# Patient Record
Sex: MALE | Race: WHITE | NOT HISPANIC OR LATINO | Employment: STUDENT | URBAN - METROPOLITAN AREA
[De-identification: names, ages, dates, MRNs, and addresses within clinical notes are randomized per-mention and may not be internally consistent; named-entity substitution may affect disease eponyms.]

---

## 2024-07-19 ENCOUNTER — APPOINTMENT (OUTPATIENT)
Dept: PRIMARY CARE | Facility: CLINIC | Age: 29
End: 2024-07-19
Payer: COMMERCIAL

## 2024-07-19 VITALS
SYSTOLIC BLOOD PRESSURE: 108 MMHG | OXYGEN SATURATION: 95 % | BODY MASS INDEX: 27.11 KG/M2 | HEART RATE: 88 BPM | HEIGHT: 69 IN | DIASTOLIC BLOOD PRESSURE: 69 MMHG | WEIGHT: 183 LBS

## 2024-07-19 DIAGNOSIS — Z00.00 ROUTINE GENERAL MEDICAL EXAMINATION AT A HEALTH CARE FACILITY: Primary | ICD-10-CM

## 2024-07-19 PROBLEM — L30.9 ECZEMA: Status: ACTIVE | Noted: 2024-07-19

## 2024-07-19 PROBLEM — M79.605 PAIN IN POSTERIOR LEFT LOWER EXTREMITY: Status: ACTIVE | Noted: 2024-07-19

## 2024-07-19 PROBLEM — L70.9 ACNE: Status: ACTIVE | Noted: 2024-07-19

## 2024-07-19 PROBLEM — M25.611 DECREASED RANGE OF MOTION OF RIGHT SHOULDER: Status: ACTIVE | Noted: 2024-07-19

## 2024-07-19 PROBLEM — M54.50 LOW BACK PAIN: Status: ACTIVE | Noted: 2024-07-19

## 2024-07-19 PROBLEM — J30.9 ALLERGIC RHINITIS: Status: ACTIVE | Noted: 2024-07-19

## 2024-07-19 PROBLEM — M25.511 RIGHT SHOULDER PAIN: Status: ACTIVE | Noted: 2024-07-19

## 2024-07-19 PROBLEM — J01.80 OTHER ACUTE SINUSITIS: Status: ACTIVE | Noted: 2024-07-19

## 2024-07-19 PROBLEM — H66.92 ACUTE LEFT OTITIS MEDIA: Status: ACTIVE | Noted: 2024-07-19

## 2024-07-19 PROBLEM — R07.9 ACUTE CHEST PAIN: Status: ACTIVE | Noted: 2024-07-19

## 2024-07-19 PROBLEM — J02.9 SORE THROAT: Status: ACTIVE | Noted: 2024-07-19

## 2024-07-19 PROBLEM — M25.541 METACARPOPHALANGEAL JOINT PAIN OF RIGHT HAND: Status: ACTIVE | Noted: 2024-07-19

## 2024-07-19 PROBLEM — M54.2 CERVICAL PAIN (NECK): Status: ACTIVE | Noted: 2024-07-19

## 2024-07-19 PROBLEM — M77.8 RIGHT WRIST TENDONITIS: Status: ACTIVE | Noted: 2024-07-19

## 2024-07-19 PROBLEM — J34.2 DEVIATED NASAL SEPTUM: Status: ACTIVE | Noted: 2024-07-19

## 2024-07-19 PROBLEM — J06.9 ACUTE UPPER RESPIRATORY INFECTION: Status: ACTIVE | Noted: 2024-07-19

## 2024-07-19 PROBLEM — S43.431A SLAP LESION OF RIGHT SHOULDER: Status: ACTIVE | Noted: 2024-07-19

## 2024-07-19 PROBLEM — Z20.822 SUSPECTED COVID-19 VIRUS INFECTION: Status: ACTIVE | Noted: 2024-07-19

## 2024-07-19 PROBLEM — M75.101 TEAR OF RIGHT ROTATOR CUFF: Status: ACTIVE | Noted: 2024-07-19

## 2024-07-19 PROBLEM — J02.9 VIRAL PHARYNGITIS: Status: ACTIVE | Noted: 2024-07-19

## 2024-07-19 PROBLEM — R44.8 FACIAL PRESSURE: Status: ACTIVE | Noted: 2024-07-19

## 2024-07-19 PROBLEM — M76.51 PATELLAR TENDINITIS OF RIGHT KNEE: Status: ACTIVE | Noted: 2024-07-19

## 2024-07-19 PROBLEM — M54.32 SCIATICA OF LEFT SIDE: Status: ACTIVE | Noted: 2024-07-19

## 2024-07-19 PROCEDURE — 3008F BODY MASS INDEX DOCD: CPT | Performed by: INTERNAL MEDICINE

## 2024-07-19 PROCEDURE — 99395 PREV VISIT EST AGE 18-39: CPT | Performed by: INTERNAL MEDICINE

## 2024-07-19 ASSESSMENT — ENCOUNTER SYMPTOMS
FEVER: 0
CHILLS: 0
SHORTNESS OF BREATH: 0
PALPITATIONS: 0
FATIGUE: 0

## 2024-07-19 ASSESSMENT — PATIENT HEALTH QUESTIONNAIRE - PHQ9
2. FEELING DOWN, DEPRESSED OR HOPELESS: NOT AT ALL
1. LITTLE INTEREST OR PLEASURE IN DOING THINGS: NOT AT ALL
SUM OF ALL RESPONSES TO PHQ9 QUESTIONS 1 AND 2: 0

## 2024-07-19 NOTE — PATIENT INSTRUCTIONS
Please complete fasting blood work. Fast for 10 hours, black coffee and water the morning of labs are OK.       1 year

## 2024-07-19 NOTE — PROGRESS NOTES
"Subjective   Patient ID: Bob Membreno is a 28 y.o. male who presents for Annual Exam.    HPI   Overall doing well  No specific concerns   No CP, SOB, MACIAS or LE edema      Review of Systems   Constitutional:  Negative for chills, fatigue and fever.   Respiratory:  Negative for shortness of breath.    Cardiovascular:  Negative for chest pain and palpitations.       Objective   /69   Pulse 88   Ht 1.753 m (5' 9\")   Wt 83 kg (183 lb)   SpO2 95%   BMI 27.02 kg/m²     Physical Exam  Constitutional:       General: He is not in acute distress.     Appearance: He is not toxic-appearing.   HENT:      Head: Normocephalic and atraumatic.      Mouth/Throat:      Mouth: Mucous membranes are moist.      Pharynx: Oropharynx is clear.   Eyes:      Pupils: Pupils are equal, round, and reactive to light.   Cardiovascular:      Rate and Rhythm: Normal rate and regular rhythm.      Heart sounds: Normal heart sounds. No murmur heard.     No friction rub. No gallop.   Pulmonary:      Effort: Pulmonary effort is normal. No respiratory distress.      Breath sounds: Normal breath sounds. No wheezing or rales.   Skin:     General: Skin is warm and dry.   Neurological:      Mental Status: He is alert and oriented to person, place, and time.   Psychiatric:         Mood and Affect: Mood normal.         Behavior: Behavior normal.         Assessment/Plan     CPE completed and FBW ordered               "

## 2024-07-19 NOTE — PROGRESS NOTES
Subjective   Patient ID: Bob Membreno is a 28 y.o. male who presents for Annual Exam.    HPI     Review of Systems    Objective   There were no vitals taken for this visit.    Physical Exam    Assessment/Plan

## 2024-07-24 ENCOUNTER — LAB (OUTPATIENT)
Dept: LAB | Facility: LAB | Age: 29
End: 2024-07-24
Payer: COMMERCIAL

## 2024-07-24 DIAGNOSIS — Z00.00 ROUTINE GENERAL MEDICAL EXAMINATION AT A HEALTH CARE FACILITY: ICD-10-CM

## 2024-07-24 LAB
ALBUMIN SERPL BCP-MCNC: 4.2 G/DL (ref 3.4–5)
ALP SERPL-CCNC: 38 U/L (ref 33–120)
ALT SERPL W P-5'-P-CCNC: 21 U/L (ref 10–52)
ANION GAP SERPL CALC-SCNC: 12 MMOL/L (ref 10–20)
AST SERPL W P-5'-P-CCNC: 20 U/L (ref 9–39)
BASOPHILS # BLD AUTO: 0.07 X10*3/UL (ref 0–0.1)
BASOPHILS NFR BLD AUTO: 1 %
BILIRUB SERPL-MCNC: 0.7 MG/DL (ref 0–1.2)
BUN SERPL-MCNC: 21 MG/DL (ref 6–23)
CALCIUM SERPL-MCNC: 8.9 MG/DL (ref 8.6–10.3)
CHLORIDE SERPL-SCNC: 101 MMOL/L (ref 98–107)
CHOLEST SERPL-MCNC: 192 MG/DL (ref 0–199)
CHOLESTEROL/HDL RATIO: 4.1
CO2 SERPL-SCNC: 27 MMOL/L (ref 21–32)
CREAT SERPL-MCNC: 0.97 MG/DL (ref 0.5–1.3)
EGFRCR SERPLBLD CKD-EPI 2021: >90 ML/MIN/1.73M*2
EOSINOPHIL # BLD AUTO: 0.21 X10*3/UL (ref 0–0.7)
EOSINOPHIL NFR BLD AUTO: 3 %
ERYTHROCYTE [DISTWIDTH] IN BLOOD BY AUTOMATED COUNT: 13 % (ref 11.5–14.5)
GLUCOSE SERPL-MCNC: 76 MG/DL (ref 74–99)
HCT VFR BLD AUTO: 43.8 % (ref 41–52)
HDLC SERPL-MCNC: 46.4 MG/DL
HGB BLD-MCNC: 14.7 G/DL (ref 13.5–17.5)
IMM GRANULOCYTES # BLD AUTO: 0.03 X10*3/UL (ref 0–0.7)
IMM GRANULOCYTES NFR BLD AUTO: 0.4 % (ref 0–0.9)
LDLC SERPL CALC-MCNC: 130 MG/DL
LYMPHOCYTES # BLD AUTO: 2.06 X10*3/UL (ref 1.2–4.8)
LYMPHOCYTES NFR BLD AUTO: 29.1 %
MCH RBC QN AUTO: 29.9 PG (ref 26–34)
MCHC RBC AUTO-ENTMCNC: 33.6 G/DL (ref 32–36)
MCV RBC AUTO: 89 FL (ref 80–100)
MONOCYTES # BLD AUTO: 0.84 X10*3/UL (ref 0.1–1)
MONOCYTES NFR BLD AUTO: 11.8 %
NEUTROPHILS # BLD AUTO: 3.88 X10*3/UL (ref 1.2–7.7)
NEUTROPHILS NFR BLD AUTO: 54.7 %
NON HDL CHOLESTEROL: 146 MG/DL (ref 0–149)
NRBC BLD-RTO: 0 /100 WBCS (ref 0–0)
PLATELET # BLD AUTO: 231 X10*3/UL (ref 150–450)
POTASSIUM SERPL-SCNC: 3.7 MMOL/L (ref 3.5–5.3)
PROT SERPL-MCNC: 6.8 G/DL (ref 6.4–8.2)
RBC # BLD AUTO: 4.91 X10*6/UL (ref 4.5–5.9)
SODIUM SERPL-SCNC: 136 MMOL/L (ref 136–145)
TRIGL SERPL-MCNC: 79 MG/DL (ref 0–149)
VLDL: 16 MG/DL (ref 0–40)
WBC # BLD AUTO: 7.1 X10*3/UL (ref 4.4–11.3)

## 2024-07-24 PROCEDURE — 85025 COMPLETE CBC W/AUTO DIFF WBC: CPT

## 2024-07-24 PROCEDURE — 80061 LIPID PANEL: CPT

## 2024-07-24 PROCEDURE — 80053 COMPREHEN METABOLIC PANEL: CPT

## 2024-07-24 PROCEDURE — 36415 COLL VENOUS BLD VENIPUNCTURE: CPT

## 2025-02-26 ENCOUNTER — APPOINTMENT (OUTPATIENT)
Dept: DERMATOLOGY | Facility: CLINIC | Age: 30
End: 2025-02-26
Payer: COMMERCIAL

## 2025-02-26 DIAGNOSIS — R21 RASH AND OTHER NONSPECIFIC SKIN ERUPTION: Primary | ICD-10-CM

## 2025-02-26 DIAGNOSIS — L57.8 SUN-DAMAGED SKIN: ICD-10-CM

## 2025-02-26 DIAGNOSIS — D22.9 MULTIPLE BENIGN MELANOCYTIC NEVI: ICD-10-CM

## 2025-02-26 DIAGNOSIS — D22.9 NEVUS SPILUS: ICD-10-CM

## 2025-02-26 DIAGNOSIS — L70.0 ACNE VULGARIS: ICD-10-CM

## 2025-02-26 DIAGNOSIS — Z12.83 SKIN CANCER SCREENING: ICD-10-CM

## 2025-02-26 PROCEDURE — 1036F TOBACCO NON-USER: CPT | Performed by: DERMATOLOGY

## 2025-02-26 PROCEDURE — 99203 OFFICE O/P NEW LOW 30 MIN: CPT | Performed by: DERMATOLOGY

## 2025-02-26 RX ORDER — MUPIROCIN 20 MG/G
OINTMENT TOPICAL
Qty: 15 G | Refills: 1 | Status: SHIPPED | OUTPATIENT
Start: 2025-02-26

## 2025-02-26 ASSESSMENT — DERMATOLOGY QUALITY OF LIFE (QOL) ASSESSMENT
RATE HOW BOTHERED YOU ARE BY SYMPTOMS OF YOUR SKIN PROBLEM (EG, ITCHING, STINGING BURNING, HURTING OR SKIN IRRITATION): 2
WHAT SINGLE SKIN CONDITION LISTED BELOW IS THE PATIENT ANSWERING THE QUALITY-OF-LIFE ASSESSMENT QUESTIONS ABOUT: NONE OF THE ABOVE
RATE HOW EMOTIONALLY BOTHERED YOU ARE BY YOUR SKIN PROBLEM (FOR EXAMPLE, WORRY, EMBARRASSMENT, FRUSTRATION): 1
RATE HOW BOTHERED YOU ARE BY EFFECTS OF YOUR SKIN PROBLEMS ON YOUR ACTIVITIES (EG, GOING OUT, ACCOMPLISHING WHAT YOU WANT, WORK ACTIVITIES OR YOUR RELATIONSHIPS WITH OTHERS): 0 - NEVER BOTHERED
DATE THE QUALITY-OF-LIFE ASSESSMENT WAS COMPLETED: 67262
ARE THERE EXCLUSIONS OR EXCEPTIONS FOR THE QUALITY OF LIFE ASSESSMENT: NO

## 2025-02-26 ASSESSMENT — DERMATOLOGY PATIENT ASSESSMENT
DO YOU USE A TANNING BED: NO
ARE YOU AN ORGAN TRANSPLANT RECIPIENT: NO
DO YOU HAVE ANY NEW OR CHANGING LESIONS: NO
DO YOU USE SUNSCREEN: OCCASIONALLY
HAVE YOU HAD OR DO YOU HAVE A STAPH INFECTION: NO
HAVE YOU HAD OR DO YOU HAVE VASCULAR DISEASE: NO

## 2025-02-26 ASSESSMENT — PATIENT GLOBAL ASSESSMENT (PGA): PATIENT GLOBAL ASSESSMENT: PATIENT GLOBAL ASSESSMENT:  2 - MILD

## 2025-02-26 ASSESSMENT — ITCH NUMERIC RATING SCALE: HOW SEVERE IS YOUR ITCHING?: 2

## 2025-02-26 NOTE — PROGRESS NOTES
Subjective     Bob Membreno is a 29 y.o. male who presents for the following: Skin Check (Spot on forehead he has had for about 5 years, and another spot on  right side of forehead.  Reports rash on bilateral elbows that itch.).     Skin Cancer History  No skin cancer on file.      Review of Systems:  No other skin or systemic complaints other than what is documented elsewhere in the note.    The following portions of the chart were reviewed this encounter and updated as appropriate:       Specialty Problems          Dermatology Problems    Acne    Eczema     Past Medical History:  Bob Membreno  has a past medical history of Acute pharyngitis, unspecified (07/28/2016), Acute pharyngitis, unspecified (03/23/2016), Acute upper respiratory infection, unspecified (02/27/2017), Contact with and (suspected) exposure to other bacterial communicable diseases (02/27/2017), Nasal congestion (06/23/2016), Other conditions influencing health status (07/05/2017), Pain in unspecified knee (12/08/2014), Personal history of diseases of the skin and subcutaneous tissue (06/27/2015), Personal history of other diseases of the respiratory system (06/30/2017), Personal history of other diseases of the respiratory system (10/05/2016), Personal history of other diseases of the respiratory system (06/23/2016), Personal history of other infectious and parasitic diseases (07/28/2016), Personal history of other infectious and parasitic diseases (03/23/2016), Pityriasis versicolor (01/08/2015), Sprain of anterior cruciate ligament of unspecified knee, initial encounter (03/23/2016), Unspecified contact dermatitis due to plants, except food (08/17/2017), and Unspecified dislocation of right acromioclavicular joint, initial encounter (03/23/2016).    Past Surgical History:  Bob Membreno  has no past surgical history on file.    Family History:  Patient family history is not on file.    Social History:  Bob Membreno  reports that he has  never smoked. He has never used smokeless tobacco. He reports current alcohol use. He reports that he does not use drugs.    Allergies:  Patient has no known allergies.    Current Medications / CAM's:    Current Outpatient Medications:     mupirocin (Bactroban) 2 % ointment, Apply to spot on neck three times daily for 10 days, Disp: 15 g, Rfl: 1     Objective   Well appearing patient in no apparent distress; mood and affect are within normal limits.    A full examination was performed including scalp, head, eyes, ears, nose, lips, neck, chest, axillae, abdomen, back, buttocks, bilateral upper extremities, bilateral lower extremities, hands, feet, fingers, toes, fingernails, and toenails.  Patient declined genital and gluteal cleft exam.  All findings within normal limits unless otherwise noted below.    - scattered regular brown macules and papules    - scattered tan macules, telangiectasias, and general photo-damage    Left Elbow - Posterior, Right Elbow - Posterior  Resolving dermatitic xerotic thin    Head - Anterior (Face), Neck - Anterior, Neck - Posterior, Nose  Acneiform papule around hair on right occipital scalp near hairline  Scattered few pustules and red papules on chest, upper back, nose    Left Upper Back  Grouped brown macules         Assessment/Plan   Rash and other nonspecific skin eruption  Left Elbow - Posterior; Right Elbow - Posterior    Favor resolving eczematous dermatitis, unclear inciting etiology- noted after trip to [place], possibly an allergic contact dermatitis. Since it is nearly resolved he defers Rx. Gave cerave oint samples to use bid until smooth    Acne vulgaris  Head - Anterior (Face); Nose; Neck - Anterior; Neck - Posterior    Chronically excoriated acneifrom papule on right posterior hairline- patient notes waxing/waning x 2 years, does admit to picking- start mupirocin oint TID x 10 days. STOP picking.     For mild papulopustular acne on trunk/nose- START benzoyl peroxide 4%  wash OTC, samples of cerave acne wash given with coupons. Return with worsening/persistence for additional management     Related Medications  mupirocin (Bactroban) 2 % ointment  Apply to spot on neck three times daily for 10 days    Multiple benign melanocytic nevi    Benign melanocytic nevi  - Discussed benign nature and that no treatment is necessary unless it becomes painful or increases in size. Patient opts for clinical monitoring at this time.    - Sun protective behavior reviewed and encouraged including the use of over-the-counter sunscreen with SPF30+ daily (reapply every 1.5 hours when outdoors), UPF clothing, broad rimmed hats, sunglasses, and avoidance of midday sun. Home skin monitoring encouraged and how to monitor for skin cancer (changing or new moles, new rapidly growing or non-healing lesions) reviewed. Patient encouraged to call with interval concerns or changes.      Nevus spilus  Left Upper Back    - Discussed benign nature and that no treatment is necessary unless it becomes painful or increases in size. Patient opts for clinical monitoring at this time.        Sun-damaged skin    Actinically damaged skin-  - Sun protective behavior reviewed and encouraged including the use of over-the-counter sunscreen with SPF30+ daily (reapply every 1.5 hours when outdoors), UPF clothing, broad rimmed hats, sunglasses, and avoidance of midday sun. Home skin monitoring encouraged and how to monitor for skin cancer (changing or new moles, new rapidly growing or non-healing lesions) reviewed. Patient encouraged to call with interval concerns or changes.      Skin cancer screening       Fu vivek Santos MD

## 2025-03-04 ENCOUNTER — OFFICE VISIT (OUTPATIENT)
Dept: PHYSICAL MEDICINE AND REHAB | Facility: CLINIC | Age: 30
End: 2025-03-04
Payer: COMMERCIAL

## 2025-03-04 ENCOUNTER — HOSPITAL ENCOUNTER (OUTPATIENT)
Dept: RADIOLOGY | Facility: HOSPITAL | Age: 30
Discharge: HOME | End: 2025-03-04
Payer: COMMERCIAL

## 2025-03-04 VITALS
HEART RATE: 80 BPM | TEMPERATURE: 97.9 F | RESPIRATION RATE: 18 BRPM | DIASTOLIC BLOOD PRESSURE: 78 MMHG | SYSTOLIC BLOOD PRESSURE: 126 MMHG

## 2025-03-04 DIAGNOSIS — M25.561 LATERAL KNEE PAIN, RIGHT: ICD-10-CM

## 2025-03-04 DIAGNOSIS — S86.911A MUSCLE STRAIN OF KNEE, RIGHT, INITIAL ENCOUNTER: ICD-10-CM

## 2025-03-04 DIAGNOSIS — M25.561 LATERAL KNEE PAIN, RIGHT: Primary | ICD-10-CM

## 2025-03-04 PROCEDURE — 73562 X-RAY EXAM OF KNEE 3: CPT | Mod: RT

## 2025-03-04 PROCEDURE — 99203 OFFICE O/P NEW LOW 30 MIN: CPT | Performed by: PHYSICAL MEDICINE & REHABILITATION

## 2025-03-04 PROCEDURE — 1036F TOBACCO NON-USER: CPT | Performed by: PHYSICAL MEDICINE & REHABILITATION

## 2025-03-04 PROCEDURE — 73562 X-RAY EXAM OF KNEE 3: CPT | Mod: RIGHT SIDE | Performed by: RADIOLOGY

## 2025-03-04 PROCEDURE — 99213 OFFICE O/P EST LOW 20 MIN: CPT | Mod: GC | Performed by: PHYSICAL MEDICINE & REHABILITATION

## 2025-03-04 ASSESSMENT — PAIN SCALES - GENERAL: PAINLEVEL_OUTOF10: 3

## 2025-03-04 NOTE — PROGRESS NOTES
NP for right knee pain.  Was stretched out and something popped. It was 3 weeks ago.  No recent xray or physical therapy

## 2025-03-04 NOTE — PROGRESS NOTES
Physical Medicine and Rehabilitation MSK Consult  03/04/25    Chief Complaint:  Knee pain right      HPI:  Bob Membreno is a  29 y.o. male, was stretching and rotated knee internal and heard a pop, no real pain right at the moment, next day pain was 7-8/10. 3-4 weeks ago. Sharp and happens when he goes to bend. Pain w walking upstairs. External rotation of knee causes more pain that increases the more he rotates it out. Movement makes it feel better. The more he walks the better it feels. When he stops walking it will start hurting again. No numbness or tingling. No icing NSAIDS, or heat. Has not tried bracing. No injuries previously. But does lift weights and run daily. Reports pain has improved from initially injury.     Pt's current medication regimen includes: None     Treatment to date:  Physical therapy: No  Medications taken to date for this complaint include the following:   None    Prior injections: No        Imaging  none     Past Medical History:   Diagnosis Date    Acute pharyngitis, unspecified 07/28/2016    Sore throat    Acute pharyngitis, unspecified 03/23/2016    Sore throat    Acute upper respiratory infection, unspecified 02/27/2017    Viral upper respiratory infection    Contact with and (suspected) exposure to other bacterial communicable diseases 02/27/2017    Exposure to strep throat    Nasal congestion 06/23/2016    Nasal congestion    Other conditions influencing health status 07/05/2017    Concussion, without LOC, initial encounter    Pain in unspecified knee 12/08/2014    Joint pain, knee    Personal history of diseases of the skin and subcutaneous tissue 06/27/2015    History of contact dermatitis    Personal history of other diseases of the respiratory system 06/30/2017    History of sore throat    Personal history of other diseases of the respiratory system 10/05/2016    History of acute sinusitis    Personal history of other diseases of the respiratory system 06/23/2016    History of  acute sinusitis    Personal history of other infectious and parasitic diseases 07/28/2016    History of viral infection    Personal history of other infectious and parasitic diseases 03/23/2016    History of viral infection    Pityriasis versicolor 01/08/2015    Tinea versicolor    Sprain of anterior cruciate ligament of unspecified knee, initial encounter 03/23/2016    Anterior cruciate ligament sprain    Unspecified contact dermatitis due to plants, except food 08/17/2017    Rhus dermatitis    Unspecified dislocation of right acromioclavicular joint, initial encounter 03/23/2016    Separation of right acromioclavicular joint, type 1        No past surgical history on file.     Patient Active Problem List    Diagnosis Date Noted    Acne 07/19/2024    Acute chest pain 07/19/2024    Acute left otitis media 07/19/2024    Acute upper respiratory infection 07/19/2024    Allergic rhinitis 07/19/2024    Cervical pain (neck) 07/19/2024    Decreased range of motion of right shoulder 07/19/2024    Deviated nasal septum 07/19/2024    Eczema 07/19/2024    Facial pressure 07/19/2024    Low back pain 07/19/2024    Metacarpophalangeal joint pain of right hand 07/19/2024    Other acute sinusitis 07/19/2024    Pain in posterior left lower extremity 07/19/2024    Patellar tendinitis of right knee 07/19/2024    Right shoulder pain 07/19/2024    Right wrist tendonitis 07/19/2024    Sciatica of left side 07/19/2024    SLAP lesion of right shoulder 07/19/2024    Suspected COVID-19 virus infection 07/19/2024    Sore throat 07/19/2024    Tear of right rotator cuff 07/19/2024    Viral pharyngitis 07/19/2024        No family history on file.     Current Outpatient Medications   Medication Sig Dispense Refill    mupirocin (Bactroban) 2 % ointment Apply to spot on neck three times daily for 10 days 15 g 1     No current facility-administered medications for this visit.        No Known Allergies     Social History     Socioeconomic History     Marital status: Single   Tobacco Use    Smoking status: Never    Smokeless tobacco: Never   Vaping Use    Vaping status: Never Used   Substance and Sexual Activity    Alcohol use: Yes     Alcohol/week: 2.0 standard drinks of alcohol     Types: 1 Glasses of wine, 1 Shots of liquor per week     Comment: social    Drug use: Never        Review of Systems:  Constitutional:  Denies fever or chills, malaise, weight changes.   Eyes:  Denies change in visual acuity   HENT:  Denies nasal congestion or sore throat   Respiratory:  Denies cough or shortness of breath   Cardiovascular:  Denies chest pain or edema   GI:  Denies abdominal pain, nausea, vomiting, bloody stools or diarrhea   :  Denies dysuria   Integument:  Denies rash   Neurologic:  As per HPI  MSK: Per above HPI  Endocrine:  Denies polyuria or polydipsia   Lymphatic:  Denies swollen glands   Psychiatric:  Denies depression or anxiety            PHYSICAL EXAM:  /78 (BP Location: Left arm, Patient Position: Sitting)   Pulse 80   Temp 36.6 °C (97.9 °F)   Resp 18     General:  NAD, well developed, male    Psychiatric: appropriate mood & affect.   Cardiovascular:  Normal pedal pulses; no LE edema.  Respiratory:  Normal rate; unlabored breathing.  Skin:  Intact; no erythema; no ecchymosis or rash.  Lymphatic:  No lymphadenopathy or lymphedema.  NEURO:  Alert and appropriate. Speech fluent, conversing appropriately.   Motor:    Rt: HF 5/5, KE 5/5, KF 5/5, DF 5/5, EHL 5/5, PF 5/5.    Lt: HF 5/5, KE 5/5, KF 5/5, DF 5/5, EHL 5/5, PF 5/5.  Sensation:     Light touch: intact in the b/l L3-S1 dermatomes.    PP: intact in the b/l L3-S1 dermatomes  Reflexes:     Right:  patellar 2+, achilles 2+,     Left: patellar 2+, achilles 2+,     Babinski's downgoing b/l; no clonus    MSK:  Inspection of the left knee reveals no joint deformity, soft tissue swelling, ecchymosis.  Knee ROM WNL and non-painful   Patient has tenderness to palpation over the lateral outside of the  leg just distal to the patella  Patellar alignment is grossly normal.   Patient has no evidence of patella paris, patella baja or squinting patella.     Special Tests:  Anterior drawer: Neg  Posterior drawer: Neg  Lachman's: Neg  Patellar apprehension: Neg  Single leg stance with rotation Neg  Pain w varus stress test        Impression: Bob Membreno is a 29 y.o. male w no significant pmh presenting with R lateral knee and fibular head pain, felt a pop w external rotation  Plan:  - Physical exam was performed and findings were discussed with patient and all questions were answered to patient's satisfaction  - Order for XR right knee to rule out fracture though unlikely given mechanism of injury  - Ordered for Advil 400-600 mg every day to BID for 5 days and then prn  - can trial voltaren gel prn  - Ordered for PT for stretching exercises and safe exercise education given injury  - Will consider right knee MRI if negative knee XR and patient still experiencing pain     - FU in 6 weeks after PT and XR are completed      Supervisory note:  Seen with Dr Chambers, resident.  I saw and evaluated the patient. I personally obtained the key and critical portions of the history and physical exam. I reviewed the resident's documentation and discussed the patient with the resident. I agree with the resident's medical decision making as documented in the resident's note.       Lola Chen MD     Division of PM&R

## 2025-03-05 DIAGNOSIS — D16.9 OSTEOCHONDROMA: ICD-10-CM

## 2025-03-05 DIAGNOSIS — M25.561 LATERAL KNEE PAIN, RIGHT: Primary | ICD-10-CM

## 2025-03-13 ENCOUNTER — APPOINTMENT (OUTPATIENT)
Dept: RADIOLOGY | Facility: CLINIC | Age: 30
End: 2025-03-13
Payer: COMMERCIAL

## 2025-03-18 ENCOUNTER — HOSPITAL ENCOUNTER (OUTPATIENT)
Dept: RADIOLOGY | Facility: CLINIC | Age: 30
Discharge: HOME | End: 2025-03-18
Payer: COMMERCIAL

## 2025-03-18 DIAGNOSIS — D16.9 OSTEOCHONDROMA: ICD-10-CM

## 2025-03-18 DIAGNOSIS — M25.561 LATERAL KNEE PAIN, RIGHT: ICD-10-CM

## 2025-03-18 PROCEDURE — 73721 MRI JNT OF LWR EXTRE W/O DYE: CPT | Mod: RT

## 2025-03-19 DIAGNOSIS — S83.421A TEAR OF LATERAL COLLATERAL LIGAMENT OF RIGHT KNEE, INITIAL ENCOUNTER: Primary | ICD-10-CM

## 2025-03-26 ENCOUNTER — APPOINTMENT (OUTPATIENT)
Dept: ORTHOPEDIC SURGERY | Facility: CLINIC | Age: 30
End: 2025-03-26
Payer: COMMERCIAL

## 2025-03-26 DIAGNOSIS — S83.421A TEAR OF LATERAL COLLATERAL LIGAMENT OF RIGHT KNEE, INITIAL ENCOUNTER: ICD-10-CM

## 2025-03-26 PROCEDURE — 99203 OFFICE O/P NEW LOW 30 MIN: CPT | Performed by: STUDENT IN AN ORGANIZED HEALTH CARE EDUCATION/TRAINING PROGRAM

## 2025-03-26 ASSESSMENT — PAIN SCALES - GENERAL: PAINLEVEL_OUTOF10: 6

## 2025-03-26 ASSESSMENT — PAIN - FUNCTIONAL ASSESSMENT: PAIN_FUNCTIONAL_ASSESSMENT: 0-10

## 2025-03-26 ASSESSMENT — PAIN DESCRIPTION - DESCRIPTORS: DESCRIPTORS: ACHING

## 2025-03-27 NOTE — PROGRESS NOTES
PRIMARY CARE PHYSICIAN: Dimitri Larose DO  REFERRING PROVIDER: Lola Chen MD  [id number]  [facility] [place] [address]     CONSULT ORTHOPAEDIC: Knee Evaluation    ASSESSMENT & PLAN    Impression/Plan: This is a 29-year-old male presenting for evaluation of acute right lateral sided knee pain status post an injury sustained.  At this time, based on clinical history, physical examination, and MRI he is suffering from a partial-thickness distal LCL/insertional injury.  At this time, his knee is stable on examination.  He is already improving on his own.  I would like him to continue working on range of motion and strengthening.  I have offered him a course of physical therapy which we will defer at this time.  He will follow-up on as-needed basis moving forward for discussion of progress.      SUBJECTIVE  CHIEF COMPLAINT:   Chief Complaint   Patient presents with    Right Knee - Pain     NPV        HPI: Bob Membreno is a 29 y.o. patient.  The patient presents for evaluation of right lateral sided knee pain.  He has no previous history of knee surgery or injections.  He has been having IT band issues on the right side and is working on stretching with the leg in a figure-of-four position.  He had a colleague press onto the knee/leg area and he felt a popping sensation to the lateral knee.  He initially ignored this but due to persistent pain he sought evaluation with x-ray and MRI.  Based on MRI findings he was referred to our office for further evaluation.  He does not feel subjectively unstable.  He has not been working physical therapy.  He has been working on his on range of motion and strengthening.  He denies any distal numbness or tingling.      REVIEW OF SYSTEMS  Constitutional: See HPI for pain assessment, No significant weight loss, recent trauma  Cardiovascular: No chest pain, shortness of breath  Respiratory: No difficulty breathing, cough  Gastrointestinal: No nausea,  vomiting, diarrhea, constipation  Musculoskeletal: Noted in HPI, positive for pain, restricted motion, stiffness  Integumentary: No rashes, easy bruising, redness   Neurological: no numbness or tingling in extremities, no gait disturbances   Psychiatric: No mood changes, memory changes, social issues  Heme/Lymph: no excessive swelling, easy bruising, excessive bleeding  ENT: No hearing changes  Eyes: No vision changes    Past Medical History:   Diagnosis Date    Acute pharyngitis, unspecified 07/28/2016    Sore throat    Acute pharyngitis, unspecified 03/23/2016    Sore throat    Acute upper respiratory infection, unspecified 02/27/2017    Viral upper respiratory infection    Contact with and (suspected) exposure to other bacterial communicable diseases 02/27/2017    Exposure to strep throat    Nasal congestion 06/23/2016    Nasal congestion    Other conditions influencing health status 07/05/2017    Concussion, without LOC, initial encounter    Pain in unspecified knee 12/08/2014    Joint pain, knee    Personal history of diseases of the skin and subcutaneous tissue 06/27/2015    History of contact dermatitis    Personal history of other diseases of the respiratory system 06/30/2017    History of sore throat    Personal history of other diseases of the respiratory system 10/05/2016    History of acute sinusitis    Personal history of other diseases of the respiratory system 06/23/2016    History of acute sinusitis    Personal history of other infectious and parasitic diseases 07/28/2016    History of viral infection    Personal history of other infectious and parasitic diseases 03/23/2016    History of viral infection    Pityriasis versicolor 01/08/2015    Tinea versicolor    Sprain of anterior cruciate ligament of unspecified knee, initial encounter 03/23/2016    Anterior cruciate ligament sprain    Unspecified contact dermatitis due to plants, except food 08/17/2017    Rhus dermatitis    Unspecified dislocation  "of right acromioclavicular joint, initial encounter 03/23/2016    Separation of right acromioclavicular joint, type 1        No Known Allergies     No past surgical history on file.     No family history on file.     Social History     Socioeconomic History    Marital status: Single     Spouse name: Not on file    Number of children: Not on file    Years of education: Not on file    Highest education level: Not on file   Occupational History    Not on file   Tobacco Use    Smoking status: Never    Smokeless tobacco: Never   Vaping Use    Vaping status: Never Used   Substance and Sexual Activity    Alcohol use: Yes     Alcohol/week: 2.0 standard drinks of alcohol     Types: 1 Glasses of wine, 1 Shots of liquor per week     Comment: social    Drug use: Never    Sexual activity: Not on file   Other Topics Concern    Not on file   Social History Narrative    Not on file     Social Drivers of Health     Financial Resource Strain: Not on file   Food Insecurity: Not on file   Transportation Needs: Not on file   Physical Activity: Not on file   Stress: Not on file   Social Connections: Not on file   Intimate Partner Violence: Not on file   Housing Stability: Not on file        CURRENT MEDICATIONS:   Current Outpatient Medications   Medication Sig Dispense Refill    mupirocin (Bactroban) 2 % ointment Apply to spot on neck three times daily for 10 days 15 g 1     No current facility-administered medications for this visit.        OBJECTIVE    PHYSICAL EXAM      12/21/2022     9:59 AM 5/25/2023     3:07 PM 7/26/2023     9:37 AM 11/6/2023    10:18 AM 11/14/2023     6:59 PM 7/19/2024    11:40 AM 3/4/2025     9:22 AM   Vitals   Systolic 100 133 118 125 113 108 126   Diastolic 60 79 76 71 79 69 78   BP Location       Left arm   Heart Rate 102 76 65 70 74 88 80   Temp  36.7 °C (98.1 °F)  36.6 °C (97.9 °F) 36.6 °C (97.9 °F)  36.6 °C (97.9 °F)   Resp  20  16 18  18   Height 1.765 m (5' 9.5\") 0.127 m (5\") 1.765 m (5' 9.5\")  1.753 m " "(5' 9\") 1.753 m (5' 9\")    Weight (lb) 195 190.04 190.25 184.97 184.97 183    BMI 28.38 kg/m2 5344.4 kg/m2 27.69 kg/m2 26.92 kg/m2 27.31 kg/m2 27.02 kg/m2    BSA (m2) 2.08 m2 0.55 m2 2.06 m2 2.03 m2 2.02 m2 2.01 m2    Visit Report      Report Report      There is no height or weight on file to calculate BMI.    GENERAL: A/Ox3, NAD. Appears healthy, well nourished  PSYCHIATRIC: Mood stable, appropriate memory recall  EYES: EOM intact, no scleral icterus  CARDIAC: regular rate  LUNGS: Breathing non-labored  SKIN: no erythema, rashes, or ecchymoses     MUSCULOSKELETAL:  Laterality: right Knee Exam  This is a well-appearing patient in no acute distress.  There is no effusion to the knee.  There is no tenderness to palpation along the medial joint line, no tenderness to palpation along lateral joint line.  He has mild tenderness along the LCL insertion onto the fibula laterally.  Range of motion: 0 to 120 degrees without pain.  There is no pain with manipulation of patella.  Negative Lachman's exam.  The knee is stable to posterior stress.  The knee is stable to varus and valgus stress at both 0 and 30 degrees knee flexion.  5/5 Strength TA, EHL, GS    NEUROVASCULAR:  - Neurovascular Status: sensation intact to light touch distally, lower extremity motor intact  - Capillary refill brisk at extremities, Bilateral dorsalis pedis pulse 2+    Imaging: Multiple views of the affected right knee(s) demonstrate: No evidence of acute fracture or dislocation.  Well-preserved medial lateral compartmental joint spacing.   X-rays were personally reviewed and interpreted by me.  Radiology reports were reviewed by me as well, if readily available at the time.    MRI of the right knee dated 3/18/2025 reveals partial-thickness tearing of the distal insertion of the LCL onto the fibula.    Sandoval Skinner MD, MPH  Attending Surgeon  Sports Medicine Orthopaedic Surgery  [facility] " [place]

## 2025-04-29 ENCOUNTER — APPOINTMENT (OUTPATIENT)
Dept: PHYSICAL MEDICINE AND REHAB | Facility: CLINIC | Age: 30
End: 2025-04-29
Payer: COMMERCIAL

## 2025-06-03 ENCOUNTER — APPOINTMENT (OUTPATIENT)
Dept: PHYSICAL MEDICINE AND REHAB | Facility: CLINIC | Age: 30
End: 2025-06-03
Payer: COMMERCIAL